# Patient Record
Sex: MALE | Race: WHITE | Employment: FULL TIME | URBAN - METROPOLITAN AREA
[De-identification: names, ages, dates, MRNs, and addresses within clinical notes are randomized per-mention and may not be internally consistent; named-entity substitution may affect disease eponyms.]

---
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
## 2021-08-03 PROBLEM — F32.A DEPRESSION: Status: RESOLVED | Noted: 2018-08-01 | Resolved: 2021-08-03

## 2022-03-18 PROBLEM — F41.9 ANXIETY AND DEPRESSION: Status: ACTIVE | Noted: 2018-09-20

## 2022-03-18 PROBLEM — F32.A ANXIETY AND DEPRESSION: Status: ACTIVE | Noted: 2018-09-20

## 2022-03-19 PROBLEM — F31.9 BIPOLAR AFFECTIVE DISORDER (HCC): Status: ACTIVE | Noted: 2018-09-20

## 2022-03-19 PROBLEM — F07.81 POST CONCUSSION SYNDROME: Status: ACTIVE | Noted: 2018-09-20

## 2022-03-19 PROBLEM — R56.9 OBSERVED SEIZURE-LIKE ACTIVITY (HCC): Status: ACTIVE | Noted: 2018-09-20

## 2022-03-20 PROBLEM — F32.9 MAJOR DEPRESSION: Status: ACTIVE | Noted: 2018-08-01

## 2022-03-20 PROBLEM — F41.9 ANXIETY: Status: ACTIVE | Noted: 2018-08-01

## 2022-06-17 ENCOUNTER — TELEPHONE (OUTPATIENT)
Dept: FAMILY MEDICINE CLINIC | Age: 22
End: 2022-06-17

## 2022-06-17 NOTE — TELEPHONE ENCOUNTER
----- Message from Paulina Krystyna sent at 2022 12:36 PM EDT -----  Subject: Appointment Request    Reason for Call: New Patient Request Appointment    QUESTIONS  Type of Appointment? New Patient/New to Provider  Reason for appointment request? No appointments available during search  Additional Information for Provider? needs a new PCP  ---------------------------------------------------------------------------  --------------  CALL BACK INFO  What is the best way for the office to contact you? OK to leave message on   voicemail  Preferred Call Back Phone Number? [phone]  ---------------------------------------------------------------------------  --------------  SCRIPT ANSWERS  Relationship to Patient? Other  Representative Name? mother  Additional information verified (besides Name and Date of Birth)? Address  Specialty Confirmation? Primary Care  Is this the first appointment to establish care for a ? No  Have you been diagnosed with COVID-19 in the past 10 days? No  (Service Expert  click yes below to proceed with [employer] As Usual   Scheduling)?  Yes

## 2022-07-08 ENCOUNTER — OFFICE VISIT (OUTPATIENT)
Dept: FAMILY MEDICINE CLINIC | Age: 22
End: 2022-07-08
Payer: COMMERCIAL

## 2022-07-08 VITALS
SYSTOLIC BLOOD PRESSURE: 132 MMHG | TEMPERATURE: 98.8 F | DIASTOLIC BLOOD PRESSURE: 88 MMHG | HEART RATE: 108 BPM | WEIGHT: 210.8 LBS | BODY MASS INDEX: 35.12 KG/M2 | HEIGHT: 65 IN | OXYGEN SATURATION: 95 % | RESPIRATION RATE: 15 BRPM

## 2022-07-08 DIAGNOSIS — R00.0 TACHYCARDIA: ICD-10-CM

## 2022-07-08 DIAGNOSIS — Z72.89 CURRENT EVERY DAY VAPING: ICD-10-CM

## 2022-07-08 DIAGNOSIS — F31.9 BIPOLAR AFFECTIVE DISORDER, REMISSION STATUS UNSPECIFIED (HCC): Primary | ICD-10-CM

## 2022-07-08 DIAGNOSIS — Z78.9 ALCOHOL USE: ICD-10-CM

## 2022-07-08 DIAGNOSIS — R03.0 ELEVATED BP WITHOUT DIAGNOSIS OF HYPERTENSION: ICD-10-CM

## 2022-07-08 DIAGNOSIS — R11.0 NAUSEA: ICD-10-CM

## 2022-07-08 DIAGNOSIS — Z13.9 SCREENING DUE: ICD-10-CM

## 2022-07-08 PROBLEM — F32.9 MAJOR DEPRESSION: Status: RESOLVED | Noted: 2018-08-01 | Resolved: 2022-07-08

## 2022-07-08 PROBLEM — F41.9 ANXIETY: Status: RESOLVED | Noted: 2018-08-01 | Resolved: 2022-07-08

## 2022-07-08 PROCEDURE — 99204 OFFICE O/P NEW MOD 45 MIN: CPT | Performed by: STUDENT IN AN ORGANIZED HEALTH CARE EDUCATION/TRAINING PROGRAM

## 2022-07-08 RX ORDER — CLOMIPRAMINE HYDROCHLORIDE 75 MG/1
75 CAPSULE ORAL
COMMUNITY

## 2022-07-08 RX ORDER — OLANZAPINE 5 MG/1
10 TABLET ORAL DAILY
COMMUNITY

## 2022-07-08 RX ORDER — PROPRANOLOL HYDROCHLORIDE 20 MG/1
TABLET ORAL
COMMUNITY

## 2022-07-08 RX ORDER — HYDROXYZINE 50 MG/1
50 TABLET, FILM COATED ORAL
COMMUNITY

## 2022-07-08 RX ORDER — PANTOPRAZOLE SODIUM 40 MG/1
40 TABLET, DELAYED RELEASE ORAL DAILY
Qty: 30 TABLET | Refills: 2 | Status: SHIPPED | OUTPATIENT
Start: 2022-07-08

## 2022-07-08 NOTE — PATIENT INSTRUCTIONS
Please make a lab only visit to get the labwork we ordered. Please take your blood pressure at home and bring it at the follow up visit. 9 Ways to Cut Back on Drinking  Maybe you've found yourself drinking more alcohol than you'd prefer. If you want to cut back, here are some ideas to try. Think before you drink. Do you really want a drink, or is it just a habit? If you're used to having a drink at a certain time, try doing something else then. Look for substitutes. Find some no-alcohol drinks that you enjoy, like flavored seltzer water, tea with honey, or tonic with a slice of lime. Or try alcohol-free beer or \"virgin\" cocktails (without the alcohol). Drink more water. Use water to quench your thirst. Drink a glass of water before you have any alcohol. Have another glass along with every drink or between drinks. Shrink your drink. For example, have a bottle of beer instead of a pint. Use a smaller glass for wine. Choose drinks with lower alcohol content (ABV%). Or use less liquor and more mixer in cocktails. Slow down. It's easy to drink quickly and without thinking about it. Pay attention, and make each drink last longer. Do the math. Total up how much you spend on alcohol each month. How much is that a year? If you cut back, what could you do with the money you save? Take a break. Choose a day or two each week when you won't drink at all. Notice how you feel on those days, physically and emotionally. How did you sleep? Do you feel better? Over time, add more break days. Count calories. Would you like to lose some weight? That can be a good motivator for cutting back. Figure out how many calories are in each drink. How many does that add up to in a day? In a week? In a month? Practice saying no. Be ready when someone offers you a drink. Try: Kranthi Salgado, I've had enough. \" Or \"Thanks, but I'm cutting back. \" Or \"No, thanks. I feel better when I drink less. \"   Current as of: November 8, 2021               Content Version: 13.2  © 0819-3210 Healthwise, [facility]. Care instructions adapted under license by [employer] (which disclaims liability or warranty for this information). If you have questions about a medical condition or this instruction, always ask your healthcare professional. [address] any warranty or liability for your use of this information.

## 2022-07-08 NOTE — ASSESSMENT & PLAN NOTE
Patient declined EKG despite education of it's importance given tachycardia. Patient reports he is chronically tachycardic but will consider EKG when he is no longer feeling unwell (with the nausea). Labs as below.

## 2022-07-08 NOTE — PROGRESS NOTES
[address] [facility] [address] [place] [address]   Office [phone], Fax [phone]      Chief Complaint:     Chief Complaint   Patient presents with    Establish Care     nausea on going for 3 days able to keep down a little but seems to throw up when he has a full meal.       Natalia Tanner is a 25 y.o. male that presents for: establish care      Subjective:   HPI:  Natalia Tanner is a 25 y.o. male that presents to establish care: Accompanied with father who helps support history. H/o Seizure like activity: Thought at that time due to that fact that Xanax was stopped cold turkey. No further issues with seizures or seizure like activity. Bipolar disorder: Follows with Dr. Elaine Horvath. Propanolol once daily 20mg in am. Atarax 50mg once in AM daily. On gabapentin 300mg BID to help with anxiety and shakiness. Patient reports that he doesn't like seeing psychiatrists but he continues to in order to get his prescriptions which he feels works well for him. Still does have trouble with sleeping at times. Nausea: For several days. Started when patient went a whole day w/o eating. Has tried to eat again but can only tolerate small meals. No episodes of vomiting. No blood in stool, diarrhea, constipation, or abdominal pain. Notes reflux hx as well. ROS:   Review of Systems   Respiratory: Negative for shortness of breath. Cardiovascular: Negative for chest pain and leg swelling. Gastrointestinal: Positive for nausea. Negative for abdominal pain, blood in stool, constipation, diarrhea and vomiting. Neurological: Negative for seizures and loss of consciousness. Psychiatric/Behavioral: Positive for depression. The patient is nervous/anxious and has insomnia. Past medical history, social history, medications, and allergies personally reviewed.   Past Medical History:   Diagnosis Date    ADHD     Anxiety     Concussion     Depression         Social Hx:   Alcohol: Beer, 40 ounce 3-4 days a work  Tobacco: Vape  Illicit drug use: None    Medications:   Current Outpatient Medications   Medication Sig    OLANZapine (ZyPREXA) 5 mg tablet Take 10 mg by mouth daily.  clomiPRAMINE (ANAFRANIL) 75 mg capsule Take 75 mg by mouth nightly.  hydrOXYzine HCL (ATARAX) 50 mg tablet Take 50 mg by mouth every six (6) hours as needed.  propranoloL (INDERAL) 20 mg tablet Take  by mouth three (3) times daily as needed.  pantoprazole (PROTONIX) 40 mg tablet Take 1 Tablet by mouth daily. No current facility-administered medications for this visit. Allergies:  No Known Allergies     Health Maintenance:  Health Maintenance Due   Topic Date Due    Hepatitis C Screening  Never done    COVID-19 Vaccine (1) Never done    Depression Monitoring  Never done    DTaP/Tdap/Td series (1 - Tdap) Never done   COVID vaccine - both doses and booster   Tetanus - in last 7 years  Objective:   Vitals reviewed. Visit Vitals  /88 (BP 1 Location: Left upper arm, BP Patient Position: Sitting, BP Cuff Size: Adult)   Pulse (!) 108   Temp 98.8 °F (37.1 °C)   Resp 15   Ht 5' 5\" (1.651 m)   Wt 210 lb 12.8 oz (95.6 kg)   SpO2 95%   BMI 35.08 kg/m²        Physical Exam:  General Alert. No distress. Not diaphoretic. No jaundice, cyanosis, pallor. HENT Head Normocephalic and atraumatic. Eyes Conjunctivae pink, no discharge. No scleral icterus. EOMI. Cardio Tachycardic rate, regular rhythm. No murmur, gallop, or friction rub. No chest wall tenderness. Pulmonary Effort normal. Breath sounds normal. No respiratory distress. No wheezes, rales, or rhonchi. No Stridor. Abdominal Soft. Bowel sounds normal. No distension. No tenderness.  Deferred. Extremities No edema of lower extremities. No tenderness. Neurological No focal deficits. Skin Skin is warm and dry. Scattered healing excoriations. No erythema. Psychiatric Mood normal.       Assessment/Plan:     1.  Bipolar affective disorder, remission status unspecified [facility])  Assessment & Plan:  Follows with Thony Patel for med management. Stable on current regimen. Will obtain metabolic panel given medications to ensure no electrolyte abnormalities. Will discuss sleep issues further at follow up visit. 2. Tachycardia  Assessment & Plan:  Patient declined EKG despite education of it's importance given tachycardia. Patient reports he is chronically tachycardic but will consider EKG when he is no longer feeling unwell (with the nausea). Labs as below. Orders:  -     METABOLIC PANEL, COMPREHENSIVE; Future  -     TSH 3RD GENERATION; Future  -     CBC WITH AUTOMATED DIFF; Future  3. Screening due  -     HEPATITIS C AB; Future  4. Nausea  Assessment & Plan:  Could be related to GERD. Will start Protonix trial.   Return precautions discussed. Orders:  -     pantoprazole (PROTONIX) 40 mg tablet; Take 1 Tablet by mouth daily. , Normal, Disp-30 Tablet, R-2  5. Elevated BP without diagnosis of hypertension  Assessment & Plan:  Patient to keep log at home over next 2 weeks and will follow up. 6. Alcohol use  Assessment & Plan:  Discussed importance of cutting back on alcohol use, especially given medications he is on. Patient given handout on ways to cut back on drinking. Follow up:   Return in about 2 weeks (around 7/22/2022), or nausea/blood pressure/labs. Pt was discussed with Dr. Anabel Dupree (attending physician). I have reviewed pertinent labs results and other data. I have discussed the diagnosis with the patient and the intended plan as seen in the above orders. The patient has received an after-visit summary and questions were answered concerning future plans. I have discussed medication side effects and warnings with the patient as well.     Yolanda Neal MD  Resident [facility]  07/08/22

## 2022-07-08 NOTE — ASSESSMENT & PLAN NOTE
Follows with Slim Seay for med management. Stable on current regimen. Will obtain metabolic panel given medications to ensure no electrolyte abnormalities. Will discuss sleep issues further at follow up visit.

## 2022-07-08 NOTE — PROGRESS NOTES
Navid Rivas is a 25 y.o. male    Chief Complaint   Patient presents with    Establish Care     nausea on going for 3 days able to keep down a little but seems to throw up when he has a full meal.       1. Have you been to the ER, urgent care clinic since your last visit? Hospitalized since your last visit? No  2. Have you seen or consulted any other health care providers outside of the [address] [place] [address] since your last visit? Include any pap smears or colon screening. No    There were no vitals taken for this visit.   3 most recent PHQ Screens 7/8/2022   Little interest or pleasure in doing things Not at all   Feeling down, depressed, irritable, or hopeless Not at all   Total Score PHQ 2 0     Health Maintenance Due   Topic Date Due    Hepatitis C Screening  Never done    COVID-19 Vaccine (1) Never done    Depression Monitoring  Never done    DTaP/Tdap/Td series (1 - Tdap) Never done

## 2022-07-08 NOTE — LETTER
7/8/2022 2:05 PM    Mr. Nichelle Nye [phone]      To whom it may concern,    Sathya Morrow was unable to work 7/5-7/7 as he was sick and under the care of our office on 7/8. Thank you.         Sincerely,      Star Benson MD

## 2022-07-09 NOTE — ASSESSMENT & PLAN NOTE
Discussed importance of cutting back on alcohol use, especially given medications he is on. Patient given handout on ways to cut back on drinking. None

## 2022-10-20 ENCOUNTER — OFFICE VISIT (OUTPATIENT)
Dept: FAMILY MEDICINE CLINIC | Age: 22
End: 2022-10-20
Payer: COMMERCIAL

## 2022-10-20 VITALS
TEMPERATURE: 98.2 F | WEIGHT: 200.2 LBS | OXYGEN SATURATION: 99 % | HEIGHT: 65 IN | DIASTOLIC BLOOD PRESSURE: 67 MMHG | BODY MASS INDEX: 33.36 KG/M2 | RESPIRATION RATE: 16 BRPM | HEART RATE: 76 BPM | SYSTOLIC BLOOD PRESSURE: 107 MMHG

## 2022-10-20 DIAGNOSIS — L30.9 ECZEMA OF BOTH HANDS: Primary | ICD-10-CM

## 2022-10-20 PROCEDURE — 99213 OFFICE O/P EST LOW 20 MIN: CPT | Performed by: STUDENT IN AN ORGANIZED HEALTH CARE EDUCATION/TRAINING PROGRAM

## 2022-10-20 RX ORDER — TRIAMCINOLONE ACETONIDE 1 MG/G
CREAM TOPICAL 2 TIMES DAILY
Qty: 15 G | Refills: 0 | Status: SHIPPED | OUTPATIENT
Start: 2022-10-20 | End: 2022-11-01 | Stop reason: SDUPTHER

## 2022-10-20 NOTE — PROGRESS NOTES
Identified pt with two pt identifiers(name and ). Reviewed record in preparation for visit and have obtained necessary documentation. Chief Complaint   Patient presents with    Rash     Bilateral hands        Health Maintenance Due   Topic    Hepatitis C Screening     COVID-19 Vaccine (1)    DTaP/Tdap/Td series (1 - Tdap)    Flu Vaccine (1)       Visit Vitals  /67 (BP 1 Location: Left upper arm, BP Patient Position: Sitting, BP Cuff Size: Large adult)   Pulse 76   Temp 98.2 °F (36.8 °C) (Oral)   Resp 16   Ht 5' 5\" (1.651 m)   Wt 200 lb 3.2 oz (90.8 kg)   SpO2 99%   BMI 33.32 kg/m²         Coordination of Care Questionnaire:  :   1) Have you been to an emergency room, urgent care, or hospitalized since your last visit? If yes, where when, and reason for visit? Uncertain, patient states he feel he went but he can't remember       2. Have seen or consulted any other health care provider since your last visit? If yes, where when, and reason for visit? NO      Patient is accompanied by self I have received verbal consent from Tracy Valles to discuss any/all medical information while they are present in the room.

## 2022-10-20 NOTE — PROGRESS NOTES
Subjective   Richard Posey is a 25 y.o. male who presents for Rash (Bilateral hands)    Rash on hands  First started years ago. Comes and goes every 2-3 weeks. He has triamcinolone 0.1% cream that he uses for 2-3 days, which helps it resolve. It is itching, but no drainage, fevers, chills, aches. No rashes other than posterior hands. Aquaphor and neosporin did not help. No asthma or food allergies. No scaling or drainage. Review of Systems   Review of Systems   Constitutional:  Negative for chills and fever. HENT:  Negative for congestion and sore throat. Eyes:  Negative for discharge and redness. Respiratory:  Negative for cough. Gastrointestinal:  Negative for diarrhea and vomiting. Skin:  Positive for rash. Negative for wound. Neurological:  Negative for weakness and numbness. Medical History  Past Medical History:   Diagnosis Date    ADHD     Anxiety     Concussion     Depression        Medications  Current Outpatient Medications   Medication Sig    triamcinolone acetonide (KENALOG) 0.1 % topical cream Apply  to affected area two (2) times a day. use thin layer    OLANZapine (ZyPREXA) 5 mg tablet Take 10 mg by mouth daily. (Patient not taking: Reported on 10/20/2022)    clomiPRAMINE (ANAFRANIL) 75 mg capsule Take 75 mg by mouth nightly. (Patient not taking: Reported on 10/20/2022)    hydrOXYzine HCL (ATARAX) 50 mg tablet Take 50 mg by mouth every six (6) hours as needed. (Patient not taking: Reported on 10/20/2022)    propranoloL (INDERAL) 20 mg tablet Take  by mouth three (3) times daily as needed. (Patient not taking: Reported on 10/20/2022)    pantoprazole (PROTONIX) 40 mg tablet Take 1 Tablet by mouth daily. (Patient not taking: Reported on 10/20/2022)     No current facility-administered medications for this visit. Immunizations     There is no immunization history on file for this patient.     Allergies   No Known Allergies    Objective   Vital Signs  Visit Vitals  BP 107/67 (BP 1 Location: Left upper arm, BP Patient Position: Sitting, BP Cuff Size: Large adult)   Pulse 76   Temp 98.2 °F (36.8 °C) (Oral)   Resp 16   Ht 5' 5\" (1.651 m)   Wt 200 lb 3.2 oz (90.8 kg)   SpO2 99%   BMI 33.32 kg/m²       Physical Examination  Physical Exam  Skin:     Findings: Rash is not crusting, scaling or vesicular. Comments: Erythematous, rough plaques diffusely located over posterior hands and MTPs, with overlying excoriations bilaterally        Assessment and Plan   Marci Campos is a 25 y.o. male who presents for Rash (Bilateral hands)    1. Eczema of both hands  - Avoid drying agents such as tap water, aluminum acetate, and lotions (mostly made of water)  - Use wetting agents such as ointments (Aquaphor or vasaline) and creams  - Apply ointments or creams within 3 minutes of bathing  - Take short showers in warm (not hot) water  - Use moisturizing soaps  - Use bath oil in baths (not baby oil)  - Minimize handwashing    - triamcinolone acetonide (KENALOG) 0.1 % topical cream; Apply  to affected area two (2) times a day. use thin layer  Dispense: 15 g; Refill: 0    Return in about 4 weeks (around 11/17/2022), or if symptoms worsen or fail to improve. Pt was discussed with Dr. Raina Sutton (attending physician). I have reviewed patient medical and social history and medications. I have reviewed pertinent labs results and other data. I have discussed the diagnosis with the patient and the intended plan as seen in the above orders. The patient has received an after-visit summary and questions were answered concerning future plans. I have discussed medication side effects and warnings with the patient as well.     Clau Patel MD  Resident, [facility]  10/20/22

## 2022-10-20 NOTE — PATIENT INSTRUCTIONS
Eczema  - Avoid drying agents such as tap water, aluminum acetate, and lotions (mostly made of water)  - Use wetting agents such as ointments (Aquaphor or vasaline) and creams  - Apply ointments or creams within 3 minutes of bathing  - Take short showers in warm (not hot) water  - Use moisturizing soaps  - Use bath oil in baths (not baby oil)  - Minimize handwashing

## 2022-11-01 DIAGNOSIS — L30.9 ECZEMA OF BOTH HANDS: ICD-10-CM

## 2022-11-01 RX ORDER — TRIAMCINOLONE ACETONIDE 1 MG/G
CREAM TOPICAL 2 TIMES DAILY
Qty: 15 G | Refills: 0 | Status: SHIPPED | OUTPATIENT
Start: 2022-11-01

## 2023-05-20 RX ORDER — OLANZAPINE 5 MG/1
10 TABLET ORAL DAILY
COMMUNITY

## 2023-05-20 RX ORDER — PANTOPRAZOLE SODIUM 40 MG/1
40 TABLET, DELAYED RELEASE ORAL DAILY
COMMUNITY
Start: 2022-07-08

## 2023-05-20 RX ORDER — TRIAMCINOLONE ACETONIDE 1 MG/G
CREAM TOPICAL 2 TIMES DAILY
COMMUNITY
Start: 2022-11-01

## 2023-05-20 RX ORDER — HYDROXYZINE 50 MG/1
50 TABLET, FILM COATED ORAL EVERY 6 HOURS PRN
COMMUNITY

## 2023-05-20 RX ORDER — CLOMIPRAMINE HYDROCHLORIDE 75 MG/1
75 CAPSULE ORAL NIGHTLY
COMMUNITY

## 2023-05-20 RX ORDER — PROPRANOLOL HYDROCHLORIDE 20 MG/1
TABLET ORAL 3 TIMES DAILY PRN
COMMUNITY

## 2025-02-14 ENCOUNTER — OFFICE VISIT (OUTPATIENT)
Age: 25
End: 2025-02-14

## 2025-02-14 VITALS
DIASTOLIC BLOOD PRESSURE: 74 MMHG | HEART RATE: 79 BPM | SYSTOLIC BLOOD PRESSURE: 127 MMHG | OXYGEN SATURATION: 99 % | BODY MASS INDEX: 24.36 KG/M2 | WEIGHT: 146.2 LBS | HEIGHT: 65 IN | TEMPERATURE: 98.6 F | RESPIRATION RATE: 18 BRPM

## 2025-02-14 DIAGNOSIS — K59.00 CONSTIPATION, UNSPECIFIED CONSTIPATION TYPE: ICD-10-CM

## 2025-02-14 DIAGNOSIS — L30.9 ECZEMA, UNSPECIFIED TYPE: ICD-10-CM

## 2025-02-14 DIAGNOSIS — Z00.00 ENCOUNTER FOR ROUTINE ADULT HEALTH EXAMINATION WITHOUT ABNORMAL FINDINGS: Primary | ICD-10-CM

## 2025-02-14 DIAGNOSIS — N52.9 ERECTILE DYSFUNCTION, UNSPECIFIED ERECTILE DYSFUNCTION TYPE: ICD-10-CM

## 2025-02-14 LAB
ALBUMIN SERPL-MCNC: 4.6 G/DL (ref 3.5–5)
ALBUMIN/GLOB SERPL: 1.6 (ref 1.1–2.2)
ALP SERPL-CCNC: 82 U/L (ref 45–117)
ALT SERPL-CCNC: 20 U/L (ref 12–78)
ANION GAP SERPL CALC-SCNC: 6 MMOL/L (ref 2–12)
AST SERPL-CCNC: 21 U/L (ref 15–37)
BASOPHILS # BLD: 0.06 K/UL (ref 0–0.1)
BASOPHILS NFR BLD: 0.6 % (ref 0–1)
BILIRUB SERPL-MCNC: 0.6 MG/DL (ref 0.2–1)
BUN SERPL-MCNC: 12 MG/DL (ref 6–20)
BUN/CREAT SERPL: 15 (ref 12–20)
CALCIUM SERPL-MCNC: 9.9 MG/DL (ref 8.5–10.1)
CHLORIDE SERPL-SCNC: 102 MMOL/L (ref 97–108)
CHOLEST SERPL-MCNC: 173 MG/DL
CO2 SERPL-SCNC: 29 MMOL/L (ref 21–32)
CREAT SERPL-MCNC: 0.82 MG/DL (ref 0.7–1.3)
DIFFERENTIAL METHOD BLD: ABNORMAL
EOSINOPHIL # BLD: 0.06 K/UL (ref 0–0.4)
EOSINOPHIL NFR BLD: 0.6 % (ref 0–7)
ERYTHROCYTE [DISTWIDTH] IN BLOOD BY AUTOMATED COUNT: 12 % (ref 11.5–14.5)
EST. AVERAGE GLUCOSE BLD GHB EST-MCNC: 105 MG/DL
GLOBULIN SER CALC-MCNC: 2.8 G/DL (ref 2–4)
GLUCOSE SERPL-MCNC: 99 MG/DL (ref 65–100)
HBA1C MFR BLD: 5.3 % (ref 4–5.6)
HCT VFR BLD AUTO: 48.5 % (ref 36.6–50.3)
HCV AB SER IA-ACNC: 0.1 INDEX
HCV AB SERPL QL IA: NONREACTIVE
HDLC SERPL-MCNC: 66 MG/DL
HDLC SERPL: 2.6 (ref 0–5)
HGB BLD-MCNC: 15.8 G/DL (ref 12.1–17)
IMM GRANULOCYTES # BLD AUTO: 0.03 K/UL (ref 0–0.04)
IMM GRANULOCYTES NFR BLD AUTO: 0.3 % (ref 0–0.5)
LDLC SERPL CALC-MCNC: 97.6 MG/DL (ref 0–100)
LYMPHOCYTES # BLD: 1.79 K/UL (ref 0.8–3.5)
LYMPHOCYTES NFR BLD: 17 % (ref 12–49)
MCH RBC QN AUTO: 30.4 PG (ref 26–34)
MCHC RBC AUTO-ENTMCNC: 32.6 G/DL (ref 30–36.5)
MCV RBC AUTO: 93.4 FL (ref 80–99)
MONOCYTES # BLD: 0.63 K/UL (ref 0–1)
MONOCYTES NFR BLD: 6 % (ref 5–13)
NEUTS SEG # BLD: 7.98 K/UL (ref 1.8–8)
NEUTS SEG NFR BLD: 75.5 % (ref 32–75)
NRBC # BLD: 0 K/UL (ref 0–0.01)
NRBC BLD-RTO: 0 PER 100 WBC
PLATELET # BLD AUTO: 265 K/UL (ref 150–400)
PMV BLD AUTO: 11.4 FL (ref 8.9–12.9)
POTASSIUM SERPL-SCNC: 4.4 MMOL/L (ref 3.5–5.1)
PROT SERPL-MCNC: 7.4 G/DL (ref 6.4–8.2)
RBC # BLD AUTO: 5.19 M/UL (ref 4.1–5.7)
SODIUM SERPL-SCNC: 137 MMOL/L (ref 136–145)
T4 FREE SERPL-MCNC: 1 NG/DL (ref 0.8–1.5)
TRIGL SERPL-MCNC: 47 MG/DL
TSH SERPL DL<=0.05 MIU/L-ACNC: 0.5 UIU/ML (ref 0.36–3.74)
VLDLC SERPL CALC-MCNC: 9.4 MG/DL
WBC # BLD AUTO: 10.6 K/UL (ref 4.1–11.1)

## 2025-02-14 PROCEDURE — 99395 PREV VISIT EST AGE 18-39: CPT | Performed by: STUDENT IN AN ORGANIZED HEALTH CARE EDUCATION/TRAINING PROGRAM

## 2025-02-14 RX ORDER — METHADONE HYDROCHLORIDE 10 MG/1
135 TABLET ORAL EVERY 4 HOURS PRN
COMMUNITY

## 2025-02-14 SDOH — ECONOMIC STABILITY: FOOD INSECURITY: WITHIN THE PAST 12 MONTHS, THE FOOD YOU BOUGHT JUST DIDN'T LAST AND YOU DIDN'T HAVE MONEY TO GET MORE.: NEVER TRUE

## 2025-02-14 SDOH — ECONOMIC STABILITY: FOOD INSECURITY: WITHIN THE PAST 12 MONTHS, YOU WORRIED THAT YOUR FOOD WOULD RUN OUT BEFORE YOU GOT MONEY TO BUY MORE.: NEVER TRUE

## 2025-02-14 ASSESSMENT — PATIENT HEALTH QUESTIONNAIRE - PHQ9
SUM OF ALL RESPONSES TO PHQ9 QUESTIONS 1 & 2: 2
SUM OF ALL RESPONSES TO PHQ QUESTIONS 1-9: 2
2. FEELING DOWN, DEPRESSED OR HOPELESS: SEVERAL DAYS
1. LITTLE INTEREST OR PLEASURE IN DOING THINGS: SEVERAL DAYS
SUM OF ALL RESPONSES TO PHQ QUESTIONS 1-9: 2

## 2025-02-14 NOTE — PROGRESS NOTES
[facility] [place] [facility]   [facility]      Chief Complaint:   Chief Complaint   Patient presents with    Annual Exam       SUBJECTIVE:  Nate Peterson is a 25 y.o. male who presents for annual examination. Denies recent hospitalizations, ED visits or illnesses.     States erectile dysfunction for several years. States difficulty obtaining and maintaining erection. States this occurs most of the time he tries to have intercourse. Described as minimal-moderate impotence.      Chronic constipation. Last seen by GI years ago and given Linzess.     PMH: eczema, former polysubstance use including heroin and meth  PSH: Denies   All: NKDA  Meds: reviewed   SocHx: States vaping throughout the day. Alcohol use last drink yesterday at night at dinner, drink before that 2 weeks ago. Denies any other recreational drug use. Diet: salads, oatmeal and smoothies more recently eating more fast food and eating out. Overall balanced. Sleep: 6-8 hours of sleep per night, naps throughout the day. States sometimes waking up at night. Denies waking short of breath or gasping. Exercise: states not exercising regularly. Currently sexually active with one female partner, does not use protection.   FamHx: grandmother with unknown cancer, father with stroke in his 50s and heart issues, mother with DM, unsure of fam hx of HTN.      Health maintenance  Immunizations:  - Flu: has not received   - COVID: received in 2019  - Tdap: has not received   - Hepatitis B: states may have received in the past  - HPV : states receiving in high school     Specialists:   - Methadone clinic. Last seen 2/10/25. Next appointment in 1 month. Currently working on tapering methadone dose.   - Dermatology: last seen 1 year ago for eczema      PMHx:  Past Medical History:   Diagnosis Date    ADHD     Anxiety     Concussion     Depression        Meds:   Current Outpatient Medications   Medication Sig

## 2025-02-14 NOTE — PROGRESS NOTES
Identified pt with two pt identifiers(name and ). Reviewed record in preparation for visit and have obtained necessary documentation.  Chief Complaint   Patient presents with    Annual Exam        Vitals:    25 1031   BP: 127/74   Site: Right Upper Arm   Position: Sitting   Cuff Size: Small Adult   Pulse: 79   Resp: 18   Temp: 98.6 °F (37 °C)   TempSrc: Oral   SpO2: 99%   Weight: 66.3 kg (146 lb 3.2 oz)   Height: 1.651 m (5' 5\")         Coordination of Care Questionnaire:  :     \"Have you been to the ER, urgent care clinic since your last visit?  Hospitalized since your last visit?\"    NO    “Have you seen or consulted any other health care providers outside of [facility] since your last visit?”    NO            Click Here for Release of Records Request

## 2025-02-24 ENCOUNTER — TELEPHONE (OUTPATIENT)
Facility: CLINIC | Age: 25
End: 2025-02-24

## 2025-02-24 NOTE — TELEPHONE ENCOUNTER
Patient was late for new patient appointment today wants to be seen earlier than first available, said I would ask you - he just was seen for a physical at another office so I'm not sure if you want to check his history before adding him to your panel earlier than first available

## 2025-02-24 NOTE — TELEPHONE ENCOUNTER
No. If he just had a physical appointment 10 days ago, we do not need to set up an urgent NP appointment. Next available is okay. Thanks.

## 2025-02-25 NOTE — TELEPHONE ENCOUNTER
PSR attempted to reach out to patient - no answer, left detailed VM for call back to schedule first available new patient appointment if patient would like